# Patient Record
Sex: MALE | Race: WHITE | NOT HISPANIC OR LATINO | Employment: OTHER | URBAN - METROPOLITAN AREA
[De-identification: names, ages, dates, MRNs, and addresses within clinical notes are randomized per-mention and may not be internally consistent; named-entity substitution may affect disease eponyms.]

---

## 2023-09-07 ENCOUNTER — TELEPHONE (OUTPATIENT)
Dept: DERMATOLOGY | Facility: CLINIC | Age: 73
End: 2023-09-07
Payer: COMMERCIAL

## 2023-09-07 NOTE — TELEPHONE ENCOUNTER
NP is a referral from Dr. Henderson with Lentigo Maligna on R mid lateral arm. Scheduled for same day wide local excision on 9/18 at 2 pm. Over the phone consult completed. Pt confirmed date, time, and location.

## 2023-09-18 ENCOUNTER — PROCEDURE VISIT (OUTPATIENT)
Dept: DERMATOLOGY | Facility: CLINIC | Age: 73
End: 2023-09-18
Payer: MEDICARE

## 2023-09-18 VITALS
WEIGHT: 181 LBS | HEART RATE: 76 BPM | HEIGHT: 71 IN | BODY MASS INDEX: 25.34 KG/M2 | DIASTOLIC BLOOD PRESSURE: 83 MMHG | SYSTOLIC BLOOD PRESSURE: 138 MMHG

## 2023-09-18 DIAGNOSIS — D03.61 MELANOMA IN SITU OF RIGHT UPPER EXTREMITY: Primary | ICD-10-CM

## 2023-09-18 PROCEDURE — 99499 UNLISTED E&M SERVICE: CPT | Mod: S$PBB,,, | Performed by: DERMATOLOGY

## 2023-09-18 PROCEDURE — 88305 TISSUE EXAM BY PATHOLOGIST: ICD-10-PCS | Mod: 26,,, | Performed by: PATHOLOGY

## 2023-09-18 PROCEDURE — 11604 EXC TR-EXT MAL+MARG 3.1-4 CM: CPT | Mod: S$PBB,51,, | Performed by: DERMATOLOGY

## 2023-09-18 PROCEDURE — 99499 NO LOS: ICD-10-PCS | Mod: S$PBB,,, | Performed by: DERMATOLOGY

## 2023-09-18 PROCEDURE — 11604 PR EXC SKIN MALIG 3.1-4 CM TRUNK,ARM,LEG: ICD-10-PCS | Mod: S$PBB,51,, | Performed by: DERMATOLOGY

## 2023-09-18 PROCEDURE — 13121 PR RECMPL WND SCALP,EXTR 2.6-7.5 CM: ICD-10-PCS | Mod: S$PBB,,, | Performed by: DERMATOLOGY

## 2023-09-18 PROCEDURE — 13122 CMPLX RPR S/A/L ADDL 5 CM/>: CPT | Mod: S$PBB,,, | Performed by: DERMATOLOGY

## 2023-09-18 PROCEDURE — 88305 TISSUE EXAM BY PATHOLOGIST: CPT | Mod: 26,,, | Performed by: PATHOLOGY

## 2023-09-18 PROCEDURE — 13121 CMPLX RPR S/A/L 2.6-7.5 CM: CPT | Mod: PBBFAC | Performed by: DERMATOLOGY

## 2023-09-18 PROCEDURE — 13122 PR REP,SKIN,SCALP/EXTREM+5 CM/<: ICD-10-PCS | Mod: S$PBB,,, | Performed by: DERMATOLOGY

## 2023-09-18 PROCEDURE — 13121 CMPLX RPR S/A/L 2.6-7.5 CM: CPT | Mod: S$PBB,,, | Performed by: DERMATOLOGY

## 2023-09-18 PROCEDURE — 88305 TISSUE EXAM BY PATHOLOGIST: CPT | Performed by: PATHOLOGY

## 2023-09-18 PROCEDURE — 13122 CMPLX RPR S/A/L ADDL 5 CM/>: CPT | Mod: PBBFAC | Performed by: DERMATOLOGY

## 2023-09-18 PROCEDURE — 11604 EXC TR-EXT MAL+MARG 3.1-4 CM: CPT | Mod: PBBFAC,51 | Performed by: DERMATOLOGY

## 2023-09-18 RX ORDER — CEPHALEXIN 500 MG/1
500 CAPSULE ORAL 3 TIMES DAILY
Qty: 30 CAPSULE | Refills: 0 | Status: SHIPPED | OUTPATIENT
Start: 2023-09-18 | End: 2023-09-28

## 2023-09-18 RX ORDER — HYDROXYUREA 500 MG/1
500 CAPSULE ORAL DAILY
COMMUNITY

## 2023-09-18 RX ORDER — HYDROCODONE BITARTRATE AND ACETAMINOPHEN 5; 325 MG/1; MG/1
1 TABLET ORAL EVERY 6 HOURS PRN
Qty: 10 TABLET | Refills: 0 | Status: SHIPPED | OUTPATIENT
Start: 2023-09-18

## 2023-09-18 NOTE — PROGRESS NOTES
New patient with a 4 months h/o lesion on the R mid lateral arm. (+) flat spot. (+) bruise. (+) irregular borders. Biopsy c/w lentigo maligna. No prior treatment. (+) h/o melanoma on chest.    Physical Exam   Skin:              R mid lateral arm with a 15 x 20 mm pink bx site.    Biopsy proven lentigo maligna- R mid lateral arm, path# [id number].  Diagnosis, photograph, and pathology report were reviewed with patient.  Discussed risks, benefits, and alternatives of surgery.  Discussed repair options including complex closure, skin flap, skin graft, and second intention healing.  Patient agreed to proceed with surgery today.      PROCEDURE: Wide local excision of lentigo maligna with complex linear repair    REFERRING MD: Ivan Henderson M.D.    SURGEON: Sandhya Sanchez MD    ASSISTANTS: Mary Caballero PA-C and Mai Joseph MA    ANESTHETIC: 12 cc 1% Lidocaine with Epinephrine 1:100,000    SURGICAL PREP: Hibiclens, prepped by Mai Joseph MA    PREOPERATIVE DIAGNOSIS: Lentigo Maligna, path # [id number]    POSTOPERATIVE DIAGNOSIS: Lentigo Maligna    PATHOLOGIC DIAGNOSIS: Pending    LOCATION: right mid lateral arm. Location verified with Dr. Henedrson's clinical photograph. Patient also verified location.    INITIAL LESION SIZE: 1.5 x 2.1 cm    DEFECT SIZE: 3.5 x 4.1 cm    THICKNESS OF MELANOMA: in situ    EXCISED MARGINS: 1 cm    DEPTH OF EXCISION DOWN TO FASCIA: yes    PREPARATION:  The diagnosis, procedure, alternatives, benefits and risks, including but not limited to: drug reactions, pain, scar or cosmetic defect, local sensation disturbances, and/or recurrence of present condition were explained to the patient. The patient elected to proceed.    PROCEDURE:  The area involved by the lentigo maligna was marked out with a surgical marking pen. The area of right mid lateral arm was prepped, draped, and anesthetized in the usual sterile fashion. Lesional tissue was carefully marked with at least 1 cm margins of  "clinically normal skin in all directions. A fusiform elliptical excision was performed with a #15 blade carried down completely through the dermis to the deep subcutaneous tissue plane just above fascia. A short suture was placed superiorly at the 12 o' clock and a long suture was placed radially at the 3 o' clock position. The lesion was then removed in total and submitted for histological margin evaluation. The operative site was then extensively undermined in all directions in the subcutaneous tissue plane. Then, electrocoagulation was used to obtain hemostasis. Blood loss was minimal. The deep subcutaneous tissue plane was closed first with 3-0 Vicryl buried vertical mattress sutures in order to close off dead space. Then, a more superficial layer of 3-0 Vicryl and 4-0 Vicryl buried vertical mattress sutures was placed in the mid-dermal and subcutaneous tissue planes in order to approximate the wound edges. The cutaneous wound edges were closed using interrupted 4-0 Prolene sutures.    The patient tolerated the procedure well.    The area was cleaned and dressed appropriately and the patient was given wound care instructions, as well as an appointment for follow-up evaluation and suture removal in 14 days. Patient was placed on Norco 5-325 mg prn postop pain and Keflex 500 mg TID x 10 days.    LENGTH OF REPAIR: 8 cm    Vitals:    09/18/23 1345 09/18/23 1538   BP: (!) 141/80 138/83   BP Location: Left arm    Patient Position: Sitting    BP Method: Small (Automatic)    Pulse: 73 76   Weight: 82.1 kg (181 lb)    Height: 5' 10.5" (1.791 m)          "

## 2023-09-21 LAB
FINAL PATHOLOGIC DIAGNOSIS: NORMAL
GROSS: NORMAL
Lab: NORMAL
MICROSCOPIC EXAM: NORMAL

## 2023-10-02 ENCOUNTER — OFFICE VISIT (OUTPATIENT)
Dept: DERMATOLOGY | Facility: CLINIC | Age: 73
End: 2023-10-02
Payer: COMMERCIAL

## 2023-10-02 DIAGNOSIS — Z09 POSTOP CHECK: Primary | ICD-10-CM

## 2023-10-02 PROCEDURE — 99999 PR PBB SHADOW E&M-EST. PATIENT-LVL II: ICD-10-PCS | Mod: PBBFAC,,, | Performed by: DERMATOLOGY

## 2023-10-02 PROCEDURE — 1101F PR PT FALLS ASSESS DOC 0-1 FALLS W/OUT INJ PAST YR: ICD-10-PCS | Mod: CPTII,S$GLB,, | Performed by: DERMATOLOGY

## 2023-10-02 PROCEDURE — 1159F MED LIST DOCD IN RCRD: CPT | Mod: CPTII,S$GLB,, | Performed by: DERMATOLOGY

## 2023-10-02 PROCEDURE — 3288F PR FALLS RISK ASSESSMENT DOCUMENTED: ICD-10-PCS | Mod: CPTII,S$GLB,, | Performed by: DERMATOLOGY

## 2023-10-02 PROCEDURE — 3288F FALL RISK ASSESSMENT DOCD: CPT | Mod: CPTII,S$GLB,, | Performed by: DERMATOLOGY

## 2023-10-02 PROCEDURE — 1126F AMNT PAIN NOTED NONE PRSNT: CPT | Mod: CPTII,S$GLB,, | Performed by: DERMATOLOGY

## 2023-10-02 PROCEDURE — 1159F PR MEDICATION LIST DOCUMENTED IN MEDICAL RECORD: ICD-10-PCS | Mod: CPTII,S$GLB,, | Performed by: DERMATOLOGY

## 2023-10-02 PROCEDURE — 1126F PR PAIN SEVERITY QUANTIFIED, NO PAIN PRESENT: ICD-10-PCS | Mod: CPTII,S$GLB,, | Performed by: DERMATOLOGY

## 2023-10-02 PROCEDURE — 99024 POSTOP FOLLOW-UP VISIT: CPT | Mod: S$GLB,,, | Performed by: DERMATOLOGY

## 2023-10-02 PROCEDURE — 1101F PT FALLS ASSESS-DOCD LE1/YR: CPT | Mod: CPTII,S$GLB,, | Performed by: DERMATOLOGY

## 2023-10-02 PROCEDURE — 99024 PR POST-OP FOLLOW-UP VISIT: ICD-10-PCS | Mod: S$GLB,,, | Performed by: DERMATOLOGY

## 2023-10-02 PROCEDURE — 99999 PR PBB SHADOW E&M-EST. PATIENT-LVL II: CPT | Mod: PBBFAC,,, | Performed by: DERMATOLOGY

## 2023-10-02 NOTE — PROGRESS NOTES
72 y.o. male patient is here for suture removal following wide local excision    Patient reports no problems.    WOUND PE:  The right mid lateral arm sutures intact. Wound healing well. Good skin edges. No signs or symptoms of infection.      IMPRESSION:  Healing operative site lentigo maligna right mid lateral arm s/p wide local excision, postop day #14, margins negative.    PLAN:  Sutures removed today by  Cristal Blackburn RN . Steri-strips applied.  Continue wound care.  Keep moist with Aquaphor.  Can start massaging in 1 month, valentine inferior redundancy  Call if any issues arise.    RTC:  In 3-6 months with Ivan Henderson M.D. for skin check or sooner if new concern arises.

## 2023-10-17 ENCOUNTER — TELEPHONE (OUTPATIENT)
Dept: PULMONOLOGY | Facility: CLINIC | Age: 73
End: 2023-10-17
Payer: MEDICARE

## 2023-10-17 NOTE — TELEPHONE ENCOUNTER
Left message on pt voicemail, informing him that I hve received his appointment with once of our providers. I also advised pt that if he wants to schedule follow up appointment or if he has any questions or concerns, he may contact the office. Office number has been provided.

## 2023-10-17 NOTE — TELEPHONE ENCOUNTER
----- Message from Mary Valdivia MA sent at 10/11/2023  3:19 PM CDT -----  Regarding: FW: Appt  Contact: Pt @ [phone]    ----- Message -----  From: Val Charles  Sent: 10/11/2023   2:16 PM CDT  To: [facility] Staff  Subject: Appt                                             Pt is calling to get appt, pt has Copd and establish care. Asking for a call back